# Patient Record
Sex: MALE | Race: WHITE | NOT HISPANIC OR LATINO | URBAN - METROPOLITAN AREA
[De-identification: names, ages, dates, MRNs, and addresses within clinical notes are randomized per-mention and may not be internally consistent; named-entity substitution may affect disease eponyms.]

---

## 2017-07-10 ENCOUNTER — EMERGENCY (EMERGENCY)
Facility: HOSPITAL | Age: 52
LOS: 1 days | Discharge: HOME | End: 2017-07-10
Attending: EMERGENCY MEDICINE | Admitting: EMERGENCY MEDICINE
Payer: COMMERCIAL

## 2017-07-10 VITALS
TEMPERATURE: 98 F | SYSTOLIC BLOOD PRESSURE: 111 MMHG | RESPIRATION RATE: 18 BRPM | HEART RATE: 74 BPM | OXYGEN SATURATION: 98 % | DIASTOLIC BLOOD PRESSURE: 70 MMHG

## 2017-07-10 VITALS — WEIGHT: 220.02 LBS
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
## 2017-07-10 DIAGNOSIS — S61.412A LACERATION WITHOUT FOREIGN BODY OF LEFT HAND, INITIAL ENCOUNTER: ICD-10-CM

## 2017-07-10 DIAGNOSIS — Z79.82 LONG TERM (CURRENT) USE OF ASPIRIN: ICD-10-CM

## 2017-07-10 PROCEDURE — 12002 RPR S/N/AX/GEN/TRNK2.6-7.5CM: CPT

## 2017-07-10 PROCEDURE — 90471 IMMUNIZATION ADMIN: CPT

## 2017-07-10 PROCEDURE — 99283 EMERGENCY DEPT VISIT LOW MDM: CPT | Mod: 25

## 2017-07-10 PROCEDURE — 90715 TDAP VACCINE 7 YRS/> IM: CPT

## 2017-07-10 RX ORDER — TETANUS TOXOID, REDUCED DIPHTHERIA TOXOID AND ACELLULAR PERTUSSIS VACCINE, ADSORBED 5; 2.5; 8; 8; 2.5 [IU]/.5ML; [IU]/.5ML; UG/.5ML; UG/.5ML; UG/.5ML
0.5 SUSPENSION INTRAMUSCULAR ONCE
Qty: 0 | Refills: 0 | Status: COMPLETED | OUTPATIENT
Start: 2017-07-10 | End: 2017-07-10

## 2017-07-10 RX ADMIN — TETANUS TOXOID, REDUCED DIPHTHERIA TOXOID AND ACELLULAR PERTUSSIS VACCINE, ADSORBED 0.5 MILLILITER(S): 5; 2.5; 8; 8; 2.5 SUSPENSION INTRAMUSCULAR at 19:29

## 2017-07-10 NOTE — ED ADULT NURSE NOTE - OBJECTIVE STATEMENT
Pt reports cutting left dorsal surface of hand with utility knife while cutting siding. Pt is unsure of when last TDAP was received

## 2017-07-10 NOTE — ED PROVIDER NOTE - OBJECTIVE STATEMENT
53 y/o RHD M p/w L hand laceration.  Cut with a utility knife today while cutting insulation.  Denies FB, decreased ROM, or other complaints.

## 2021-03-11 NOTE — ED ADULT NURSE NOTE - NS TRANSFER PATIENT BELONGINGS
Clothing Partial Purse String (Simple) Text: Given the location of the defect and the characteristics of the surrounding skin a simple purse string closure was deemed most appropriate.  Undermining was performed circumfirentially around the surgical defect.  A purse string suture was then placed and tightened. Wound tension only allowed a partial closure of the circular defect.

## 2021-03-16 ENCOUNTER — RESULT REVIEW (OUTPATIENT)
Age: 56
End: 2021-03-16

## 2021-10-05 ENCOUNTER — EMERGENCY (EMERGENCY)
Facility: HOSPITAL | Age: 56
LOS: 1 days | Discharge: HOME | End: 2021-10-05
Attending: EMERGENCY MEDICINE | Admitting: EMERGENCY MEDICINE
Payer: COMMERCIAL

## 2021-10-05 VITALS
RESPIRATION RATE: 16 BRPM | DIASTOLIC BLOOD PRESSURE: 80 MMHG | SYSTOLIC BLOOD PRESSURE: 159 MMHG | WEIGHT: 220.46 LBS | HEART RATE: 89 BPM | TEMPERATURE: 98 F | OXYGEN SATURATION: 98 %

## 2021-10-05 LAB
ANION GAP SERPL CALC-SCNC: 3 MMOL/L — LOW (ref 5–17)
BASOPHILS # BLD AUTO: 0.05 K/UL — SIGNIFICANT CHANGE UP (ref 0–0.2)
BASOPHILS NFR BLD AUTO: 0.8 % — SIGNIFICANT CHANGE UP (ref 0–2)
BUN SERPL-MCNC: 13 MG/DL — SIGNIFICANT CHANGE UP (ref 7–23)
CALCIUM SERPL-MCNC: 8.4 MG/DL — LOW (ref 8.5–10.1)
CHLORIDE SERPL-SCNC: 109 MMOL/L — HIGH (ref 96–108)
CO2 SERPL-SCNC: 30 MMOL/L — SIGNIFICANT CHANGE UP (ref 22–31)
CREAT SERPL-MCNC: 1.2 MG/DL — SIGNIFICANT CHANGE UP (ref 0.5–1.3)
EOSINOPHIL # BLD AUTO: 0.09 K/UL — SIGNIFICANT CHANGE UP (ref 0–0.5)
EOSINOPHIL NFR BLD AUTO: 1.4 % — SIGNIFICANT CHANGE UP (ref 0–6)
GLUCOSE SERPL-MCNC: 80 MG/DL — SIGNIFICANT CHANGE UP (ref 70–99)
HCT VFR BLD CALC: 41.3 % — SIGNIFICANT CHANGE UP (ref 39–50)
HGB BLD-MCNC: 13.9 G/DL — SIGNIFICANT CHANGE UP (ref 13–17)
IMM GRANULOCYTES NFR BLD AUTO: 0.3 % — SIGNIFICANT CHANGE UP (ref 0–1.5)
LYMPHOCYTES # BLD AUTO: 1.79 K/UL — SIGNIFICANT CHANGE UP (ref 1–3.3)
LYMPHOCYTES # BLD AUTO: 28.8 % — SIGNIFICANT CHANGE UP (ref 13–44)
MAGNESIUM SERPL-MCNC: 2.5 MG/DL — SIGNIFICANT CHANGE UP (ref 1.6–2.6)
MCHC RBC-ENTMCNC: 29.7 PG — SIGNIFICANT CHANGE UP (ref 27–34)
MCHC RBC-ENTMCNC: 33.7 GM/DL — SIGNIFICANT CHANGE UP (ref 32–36)
MCV RBC AUTO: 88.2 FL — SIGNIFICANT CHANGE UP (ref 80–100)
MONOCYTES # BLD AUTO: 0.68 K/UL — SIGNIFICANT CHANGE UP (ref 0–0.9)
MONOCYTES NFR BLD AUTO: 10.9 % — SIGNIFICANT CHANGE UP (ref 2–14)
NEUTROPHILS # BLD AUTO: 3.59 K/UL — SIGNIFICANT CHANGE UP (ref 1.8–7.4)
NEUTROPHILS NFR BLD AUTO: 57.8 % — SIGNIFICANT CHANGE UP (ref 43–77)
NRBC # BLD: 0 /100 WBCS — SIGNIFICANT CHANGE UP (ref 0–0)
PLATELET # BLD AUTO: 260 K/UL — SIGNIFICANT CHANGE UP (ref 150–400)
POTASSIUM SERPL-MCNC: 3.8 MMOL/L — SIGNIFICANT CHANGE UP (ref 3.5–5.3)
POTASSIUM SERPL-SCNC: 3.8 MMOL/L — SIGNIFICANT CHANGE UP (ref 3.5–5.3)
RBC # BLD: 4.68 M/UL — SIGNIFICANT CHANGE UP (ref 4.2–5.8)
RBC # FLD: 11.9 % — SIGNIFICANT CHANGE UP (ref 10.3–14.5)
SODIUM SERPL-SCNC: 142 MMOL/L — SIGNIFICANT CHANGE UP (ref 135–145)
TROPONIN I SERPL-MCNC: <.015 NG/ML — SIGNIFICANT CHANGE UP (ref 0.01–0.04)
WBC # BLD: 6.22 K/UL — SIGNIFICANT CHANGE UP (ref 3.8–10.5)
WBC # FLD AUTO: 6.22 K/UL — SIGNIFICANT CHANGE UP (ref 3.8–10.5)

## 2021-10-05 PROCEDURE — 85025 COMPLETE CBC W/AUTO DIFF WBC: CPT

## 2021-10-05 PROCEDURE — 84484 ASSAY OF TROPONIN QUANT: CPT

## 2021-10-05 PROCEDURE — 80048 BASIC METABOLIC PNL TOTAL CA: CPT

## 2021-10-05 PROCEDURE — 93010 ELECTROCARDIOGRAM REPORT: CPT

## 2021-10-05 PROCEDURE — 93005 ELECTROCARDIOGRAM TRACING: CPT

## 2021-10-05 PROCEDURE — 99285 EMERGENCY DEPT VISIT HI MDM: CPT

## 2021-10-05 PROCEDURE — 83735 ASSAY OF MAGNESIUM: CPT

## 2021-10-05 PROCEDURE — 99284 EMERGENCY DEPT VISIT MOD MDM: CPT | Mod: 25

## 2021-10-05 PROCEDURE — 36415 COLL VENOUS BLD VENIPUNCTURE: CPT

## 2021-10-05 RX ORDER — PANTOPRAZOLE SODIUM 20 MG/1
0 TABLET, DELAYED RELEASE ORAL
Qty: 0 | Refills: 0 | DISCHARGE

## 2021-10-05 NOTE — ED PROVIDER NOTE - PATIENT PORTAL LINK FT
You can access the FollowMyHealth Patient Portal offered by [facility] by registering at the following website: [email or web address]. By joining [facility]’s FollowMyHealth portal, you will also be able to view your health information using other applications (apps) compatible with our system.

## 2021-10-05 NOTE — ED PROVIDER NOTE - DISCHARGE REVIEW MATERIAL PRESENTED
Patient admitted to room 232 from ED; alert and oriented. Report received from MORIS Quiñonez. Patient oriented to call light and admission materials.    .

## 2021-10-05 NOTE — ED PROVIDER NOTE - NSFOLLOWUPINSTRUCTIONS_ED_ALL_ED_FT
Return to the ED for any new or worsening symptoms  Take your medication as prescribed  Limit you caffeine use   Follow up with your cardiologist Dr. Roman call tomorrow to schedule follow up  Advance activity as tolerated     Palpitations    Palpitations are feelings that your heartbeat is not normal. Your heartbeat may feel like it is:  •Uneven.      •Faster than normal.      •Fluttering.      •Skipping a beat.      This is usually not a serious problem. In some cases, you may need tests to rule out any serious problems.      Follow these instructions at home:    Pay attention to any changes in your condition. Take these actions to help manage your symptoms:    Eating and drinking   •Avoid:  •Coffee, tea, soft drinks, and energy drinks.      •Chocolate.      •Alcohol.      •Diet pills.          Lifestyle    •Try to lower your stress. These things can help you relax:  •Yoga.      •Deep breathing and meditation.      •Exercise.      •Using words and images to create positive thoughts (guided imagery).      •Using your mind to control things in your body (biofeedback).        • Do not use drugs.      •Get plenty of rest and sleep. Keep a regular bed time.        General instructions      •Take over-the-counter and prescription medicines only as told by your doctor.      • Do not use any products that contain nicotine or tobacco, such as cigarettes and e-cigarettes. If you need help quitting, ask your doctor.      •Keep all follow-up visits as told by your doctor. This is important. You may need more tests if palpitations do not go away or get worse.        Contact a doctor if:    •Your symptoms last more than 24 hours.      •Your symptoms occur more often.        Get help right away if you:    •Have chest pain.      •Feel short of breath.      •Have a very bad headache.      •Feel dizzy.      •Pass out (faint).        Summary    •Palpitations are feelings that your heartbeat is uneven or faster than normal. It may feel like your heart is fluttering or skipping a beat.      •Avoid food and drinks that may cause palpitations. These include caffeine, chocolate, and alcohol.      •Try to lower your stress. Do not smoke or use drugs.      •Get help right away if you faint or have chest pain, shortness of breath, a severe headache, or dizziness.      This information is not intended to replace advice given to you by your health care provider. Make sure you discuss any questions you have with your health care provider.

## 2021-10-05 NOTE — ED ADULT NURSE NOTE - OBJECTIVE STATEMENT
Received Pt awake and alert, c/o feeling like heart was skipping a beat, started about 1 hour ago, denies chest pain.

## 2021-10-05 NOTE — ED PROVIDER NOTE - ATTENDING CONTRIBUTION TO CARE
57 y/o male with PMHX Hiatal hernia and GERD presents today due to heart skipped beat. pt reports he was feeling his pulse in which he felt a beat skipped. pt reports uncomfortable feeling in stomach. pt denies chest pain, SOB, palpitations, dizziness, diaphoresis, N/V, or any other complaints.

## 2021-10-05 NOTE — ED PROVIDER NOTE - CLINICAL SUMMARY MEDICAL DECISION MAKING FREE TEXT BOX
presents today due to heart skipped beat. pt reports he was feeling his pulse in which he felt a beat skipped. pt reports uncomfortable feeling in stomach. plan includes EKG r/o arrythmia and cardiac monitor evaluating for arrythmia.

## 2021-10-05 NOTE — ED PROVIDER NOTE - PHYSICAL EXAMINATION

## 2021-10-06 VITALS
OXYGEN SATURATION: 97 % | DIASTOLIC BLOOD PRESSURE: 77 MMHG | RESPIRATION RATE: 18 BRPM | TEMPERATURE: 98 F | SYSTOLIC BLOOD PRESSURE: 150 MMHG | HEART RATE: 90 BPM

## 2021-10-06 PROBLEM — L40.9 PSORIASIS, UNSPECIFIED: Chronic | Status: ACTIVE | Noted: 2017-07-10

## 2023-08-18 NOTE — ED ADULT NURSE NOTE - FALL HARM RISK TYPE OF ASSESSMENT
Lab Results   Component Value Date    LDL 80.00 08/14/2023       Lab Results   Component Value Date    TRIG 159 (H) 08/14/2023       Lab Results   Component Value Date    HDL 36 08/14/2023        Lab Results   Component Value Date    CHOL 148 08/14/2023   Continue pravastatin and fenofibrate - refilled today.  Follow a low cholesterol, low saturated fat diet with less than 200 mg of cholesterol a day.   Avoid fried foods and high saturated fats.  Add flax seed or fish oil supplements to diet.   Increase dietary fiber.   Regular exercise improves cholesterol levels.  Physical activity 5 times a week for 30 minutes per day (or 150 minutes per week).   Stressed importance of dietary modifications.     Daily Assessment

## 2024-08-21 NOTE — ED PROVIDER NOTE - SKIN, MLM
SENSITIVE SKIN CARE    Bathe or shower daily - use warm, NOT HOT, water.  Moisturize twice daily with thick cream moisturizer.  Avoid fabric softeners, use fragrance-free detergents.     Note: These lists are suggestions for fragrance-free products.   Always read ingredients on the label. Be sure that products are fragrance-free.  Avoid products that have “fragrance” as one of the ingredients. Products that are  unscented may still have fragrance added.   Avoid products with added ingredients: Vitamin E, witch hazel, menthol, and acids.  These are some of the products that you could use. If you use other products, be sure to read the ingredient label.    Moisturizers: Use them two times a day, even if skin does not feel dry.    Ointments (Best)   Aquaphor Healing Ointment®   Petroleum Jelly®   Vaseline®   Petrolatum   White petrolatum   Vaniply Ointment    Creams (Good)   CeraVe Moisturizing Cream   Cetaphil® Moisturizing Cream   Aveeno® Eczema Therapy Moisturizing  Cream   Eucerin® Original Moisturizing  Crème   Vanicream Cream®    Do not use lotions. They have alcohol and a lot of water in them.    Skin cleansers: Use only on areas that need to be cleaned.   Dove®  Sensitive Skin Beauty Bar    Aveeno®: Skin Relief Body Wash   Aquaphor Gentle Wash and Shampoo   CeraVe Hydrating Cleanser   Vanicream® Cleansing Bar   Cetaphil® Gentle Skin Cleanser/ Gentle Cleansing Bar    Laundry detergent:   All® Free Clear  Tide® Free & Gentle Liquid   Cheer® Free & Gentle  Purex® Free & Clear   Arm & Hammer® Perfume and Dye Free Liquid  Do not use Ivory® ?r Dreft® laundry soap. These both have fragrance in them.  Do not use fabric softeners. The liquid type and fabric sheets have fragrance in them.      Dr. Canela- patch testing    L hand: 4 cm laceration over dorsal aspect of thenar eminence.  FROM, motor 5/5, sensation intact, CR<2secs.

## 2025-01-01 NOTE — ED PROVIDER NOTE - PRINCIPAL DIAGNOSIS
Laceration of left hand without foreign body, initial encounter Encounter for other suspected maternal and fetal conditions ruled out